# Patient Record
Sex: MALE | Race: WHITE | URBAN - METROPOLITAN AREA
[De-identification: names, ages, dates, MRNs, and addresses within clinical notes are randomized per-mention and may not be internally consistent; named-entity substitution may affect disease eponyms.]

---

## 2021-09-21 ENCOUNTER — HOSPITAL ENCOUNTER (EMERGENCY)
Facility: HOSPITAL | Age: 46
Discharge: HOME | End: 2021-09-21
Attending: EMERGENCY MEDICINE | Admitting: EMERGENCY MEDICINE
Payer: MEDICARE

## 2021-09-21 ENCOUNTER — APPOINTMENT (EMERGENCY)
Dept: RADIOLOGY | Facility: HOSPITAL | Age: 46
End: 2021-09-21
Payer: MEDICARE

## 2021-09-21 VITALS
OXYGEN SATURATION: 97 % | HEIGHT: 71 IN | SYSTOLIC BLOOD PRESSURE: 158 MMHG | TEMPERATURE: 96.7 F | RESPIRATION RATE: 20 BRPM | HEART RATE: 110 BPM | DIASTOLIC BLOOD PRESSURE: 101 MMHG

## 2021-09-21 DIAGNOSIS — J96.01 ACUTE RESPIRATORY FAILURE WITH HYPOXIA (HCC): ICD-10-CM

## 2021-09-21 DIAGNOSIS — R06.02 SHORTNESS OF BREATH: Primary | ICD-10-CM

## 2021-09-21 LAB
ALBUMIN SERPL BCP-MCNC: 3.8 G/DL (ref 3.5–5)
ALP SERPL-CCNC: 75 U/L (ref 46–116)
ALT SERPL W P-5'-P-CCNC: 68 U/L (ref 12–78)
ANION GAP SERPL CALCULATED.3IONS-SCNC: 3 MMOL/L (ref 4–13)
AST SERPL W P-5'-P-CCNC: 31 U/L (ref 5–45)
ATRIAL RATE: 118 BPM
BASE EX.OXY STD BLDV CALC-SCNC: 79.6 % (ref 60–80)
BASE EXCESS BLDV CALC-SCNC: 0.3 MMOL/L
BASOPHILS # BLD AUTO: 0.05 THOUSANDS/ΜL (ref 0–0.1)
BASOPHILS NFR BLD AUTO: 1 % (ref 0–1)
BILIRUB SERPL-MCNC: 0.64 MG/DL (ref 0.2–1)
BUN SERPL-MCNC: 9 MG/DL (ref 5–25)
CALCIUM SERPL-MCNC: 9.8 MG/DL (ref 8.3–10.1)
CHLORIDE SERPL-SCNC: 106 MMOL/L (ref 100–108)
CO2 SERPL-SCNC: 30 MMOL/L (ref 21–32)
CREAT SERPL-MCNC: 0.78 MG/DL (ref 0.6–1.3)
D DIMER PPP FEU-MCNC: 0.27 UG/ML FEU
EOSINOPHIL # BLD AUTO: 0.54 THOUSAND/ΜL (ref 0–0.61)
EOSINOPHIL NFR BLD AUTO: 6 % (ref 0–6)
ERYTHROCYTE [DISTWIDTH] IN BLOOD BY AUTOMATED COUNT: 12.3 % (ref 11.6–15.1)
GFR SERPL CREATININE-BSD FRML MDRD: 108 ML/MIN/1.73SQ M
GLUCOSE SERPL-MCNC: 96 MG/DL (ref 65–140)
HCO3 BLDV-SCNC: 26.7 MMOL/L (ref 24–30)
HCT VFR BLD AUTO: 42.7 % (ref 36.5–49.3)
HGB BLD-MCNC: 14.6 G/DL (ref 12–17)
IMM GRANULOCYTES # BLD AUTO: 0.03 THOUSAND/UL (ref 0–0.2)
IMM GRANULOCYTES NFR BLD AUTO: 0 % (ref 0–2)
LYMPHOCYTES # BLD AUTO: 2.08 THOUSANDS/ΜL (ref 0.6–4.47)
LYMPHOCYTES NFR BLD AUTO: 21 % (ref 14–44)
MCH RBC QN AUTO: 29.5 PG (ref 26.8–34.3)
MCHC RBC AUTO-ENTMCNC: 34.2 G/DL (ref 31.4–37.4)
MCV RBC AUTO: 86 FL (ref 82–98)
MONOCYTES # BLD AUTO: 0.74 THOUSAND/ΜL (ref 0.17–1.22)
MONOCYTES NFR BLD AUTO: 8 % (ref 4–12)
NEUTROPHILS # BLD AUTO: 6.27 THOUSANDS/ΜL (ref 1.85–7.62)
NEUTS SEG NFR BLD AUTO: 64 % (ref 43–75)
NRBC BLD AUTO-RTO: 0 /100 WBCS
NT-PROBNP SERPL-MCNC: 11 PG/ML
O2 CT BLDV-SCNC: 16.7 ML/DL
P AXIS: 25 DEGREES
PCO2 BLDV: 49.6 MM HG (ref 42–50)
PH BLDV: 7.35 [PH] (ref 7.3–7.4)
PLATELET # BLD AUTO: 235 THOUSANDS/UL (ref 149–390)
PMV BLD AUTO: 9.3 FL (ref 8.9–12.7)
PO2 BLDV: 44.5 MM HG (ref 35–45)
POTASSIUM SERPL-SCNC: 3.9 MMOL/L (ref 3.5–5.3)
PR INTERVAL: 160 MS
PROT SERPL-MCNC: 7.7 G/DL (ref 6.4–8.2)
QRS AXIS: 8 DEGREES
QRSD INTERVAL: 86 MS
QT INTERVAL: 310 MS
QTC INTERVAL: 434 MS
RBC # BLD AUTO: 4.95 MILLION/UL (ref 3.88–5.62)
SARS-COV-2 RNA RESP QL NAA+PROBE: NEGATIVE
SODIUM SERPL-SCNC: 139 MMOL/L (ref 136–145)
T WAVE AXIS: 10 DEGREES
TROPONIN I SERPL-MCNC: <0.02 NG/ML
VENTRICULAR RATE: 118 BPM
WBC # BLD AUTO: 9.71 THOUSAND/UL (ref 4.31–10.16)

## 2021-09-21 PROCEDURE — 85379 FIBRIN DEGRADATION QUANT: CPT | Performed by: EMERGENCY MEDICINE

## 2021-09-21 PROCEDURE — U0003 INFECTIOUS AGENT DETECTION BY NUCLEIC ACID (DNA OR RNA); SEVERE ACUTE RESPIRATORY SYNDROME CORONAVIRUS 2 (SARS-COV-2) (CORONAVIRUS DISEASE [COVID-19]), AMPLIFIED PROBE TECHNIQUE, MAKING USE OF HIGH THROUGHPUT TECHNOLOGIES AS DESCRIBED BY CMS-2020-01-R: HCPCS | Performed by: EMERGENCY MEDICINE

## 2021-09-21 PROCEDURE — 93010 ELECTROCARDIOGRAM REPORT: CPT | Performed by: INTERNAL MEDICINE

## 2021-09-21 PROCEDURE — 36415 COLL VENOUS BLD VENIPUNCTURE: CPT | Performed by: EMERGENCY MEDICINE

## 2021-09-21 PROCEDURE — 36000 PLACE NEEDLE IN VEIN: CPT | Performed by: EMERGENCY MEDICINE

## 2021-09-21 PROCEDURE — 85025 COMPLETE CBC W/AUTO DIFF WBC: CPT | Performed by: EMERGENCY MEDICINE

## 2021-09-21 PROCEDURE — U0005 INFEC AGEN DETEC AMPLI PROBE: HCPCS | Performed by: EMERGENCY MEDICINE

## 2021-09-21 PROCEDURE — 76937 US GUIDE VASCULAR ACCESS: CPT | Performed by: EMERGENCY MEDICINE

## 2021-09-21 PROCEDURE — 93005 ELECTROCARDIOGRAM TRACING: CPT

## 2021-09-21 PROCEDURE — 82805 BLOOD GASES W/O2 SATURATION: CPT | Performed by: EMERGENCY MEDICINE

## 2021-09-21 PROCEDURE — 99285 EMERGENCY DEPT VISIT HI MDM: CPT

## 2021-09-21 PROCEDURE — 84484 ASSAY OF TROPONIN QUANT: CPT | Performed by: EMERGENCY MEDICINE

## 2021-09-21 PROCEDURE — 83880 ASSAY OF NATRIURETIC PEPTIDE: CPT | Performed by: EMERGENCY MEDICINE

## 2021-09-21 PROCEDURE — 99291 CRITICAL CARE FIRST HOUR: CPT | Performed by: EMERGENCY MEDICINE

## 2021-09-21 PROCEDURE — 71045 X-RAY EXAM CHEST 1 VIEW: CPT

## 2021-09-21 PROCEDURE — 80053 COMPREHEN METABOLIC PANEL: CPT | Performed by: EMERGENCY MEDICINE

## 2021-09-21 NOTE — ED NOTES
Unable to obtain IV access, Dr Royanne Najjar aware of need for [address], RN  09/21/21 3156

## 2021-09-22 NOTE — ED ATTENDING ATTESTATION
9/21/2021  IAnthony DO, saw and evaluated the patient  I have discussed the patient with the resident/non-physician practitioner and agree with the resident's/non-physician practitioner's findings, Plan of Care, and MDM as documented in the resident's/non-physician practitioner's note, except where noted  All available labs and Radiology studies were reviewed  I was present for key portions of any procedure(s) performed by the resident/non-physician practitioner and I was immediately available to provide assistance  At this point I agree with the current assessment done in the Emergency Department  I have conducted an independent evaluation of this patient a history and physical is as follows:    77-year-old male presents for altered mental status and hypoxia  Found by family member confused and breathing heavily  EMS was called  EMS states that when they arrived he had multiple bags of a white powder substance around him akin to drug paraphernalia with multiple needles around him as well  He has a history of IV drug abuse according to EMS and this is confirmed in the medical records  Patient is somnolent with reactive pupils  He denies any drug use  He states he thinks this is COVID  Apparently EMS states that family stated that someone works in the healthcare system in his family and test him at home regularly when he becomes altered similar to his presentation right now  Patient has no evidence of any trauma  He is afebrile  He was hypoxic to the mid 80s and placed on 4 L nasal cannula with improvement  He has no specific complaints other than that he feels like his chest is tight  When I asked him what the needles, drugs, and drug paraphernalia around him were if they were in fact not illicit drugs, he states initially it was for his dogs asthma    A few minutes later he then stated that it was for his dog's hair loss as they were trying to hair transplants or plugs akin to Pati  in male pattern baldness  He then fell asleep again  I again asked him what the substances around him at home were when EMS found him, and he now tells me that he doesn't have any needles nor does he have a dog  A/p: hypoxia, AMS likely 2/2 drug abuse, rule out covid, labs, trop, ddimer but doubt PE, cxr    cxr reviewed by me and shows atelectasis, no signs of pna, no leukocytosis  paitent now saturating 98 percent on room air while ambulating  No need for abx  His hypoxia is likely 2/2 drug overdose he still denies this  I placed an US guided IV line in his left deep brachial vein due to difficulty for nurse in  vasculature access  Procedure Note - Ultrasound Guided Peripheral IV    Ultrasound dynamic guidance was used for peripheral line insertion  Risks and benefits of the procedure were discussed with the patient  Discussed risks included pain with the procedure, bleeding, and risk of infection  Indication: Difficult non-ultrasound guided peripheral intravenous line insertion  Location: Laterally: left upper extremity  Procedure: The patient was prepped using standard ultrasound guided IV procedures  Using direct visualization of the intravenous catheter/needle, the vessel was successfully cannulated with return of blood and advancement of the catheter  The catheter was secured in the standard technique  Complications: None  Interpretation: Successful ultrasound guided peripheral IV  This is a billable ultrasound guided procedure  Ultrasound images stored on the [employer] ultrasound image , or as an attachment to this chart          ED Course         Critical Care Time  CriticalCare Time  Performed by: Maya Solano DO  Authorized by: Maya Solano DO     Critical care provider statement:     Critical care time (minutes):  30    Critical care time was exclusive of:  Separately billable procedures and treating other patients and teaching time    Critical care was necessary to treat or prevent imminent or life-threatening deterioration of the following conditions:  Respiratory failure    Critical care was time spent personally by me on the following activities:  Blood draw for specimens, obtaining history from patient or surrogate, re-evaluation of patient's condition, review of old charts, evaluation of patient's response to treatment, examination of patient, ordering and review of laboratory studies, ordering and performing treatments and interventions, development of treatment plan with patient or surrogate and ordering and review of radiographic studies

## 2021-09-22 NOTE — ED PROVIDER NOTES
History  Chief Complaint   Patient presents with    Altered Mental Status     Pt brought in for evaluation of AMS  per EMS daughter called ems after pt found altered at home     HPI     54 yo M with unknown past medical history who presents for evaluation of shortness of breath and altered mental status  Patient was brought in by EMS  Per EMS, patient's family called for 911 because patient was altered and confused tonight  EMS states patient had drugs and drug paraphenelia around him tonight  Patient denies any drug use but per EMS has a history of IV drug use  EMS states patient did desat to the mid 80's during transport  He arrives on 6L NC with O2 sat of 100%  Patient states he has been having difficulty breathing for the past one week  He states he thinks he [facility]  Patient is not vaccinated against COVID  Patient denies fevers, chills, chest pain, or cough  Patient states he has had genearlized abdominal pain over the past week  Denies nausea, vomiting or diarrhea  History is limited secondary to patient being poor historian  None       No past medical history on file  No past surgical history on file  No family history on file  I have reviewed and agree with the history as documented  E-Cigarette/Vaping     E-Cigarette/Vaping Substances     Social History     Tobacco Use    Smoking status: Never Smoker   Substance Use Topics    Alcohol use: Not Currently    Drug use: Not Currently     Comment: Pt denies        Review of Systems   Constitutional: Negative for chills and fever  Respiratory: Positive for chest tightness and shortness of breath  Negative for cough  Cardiovascular: Negative for chest pain  Gastrointestinal: Positive for abdominal pain  Negative for diarrhea, nausea and vomiting  All other systems reviewed and are negative        Physical Exam  ED Triage Vitals [09/21/21 1748]   Temperature Pulse Respirations Blood Pressure SpO2   (!) 96 7 °F (35 9 °C) 66 15 (!) 191/131 97 %      Temp Source Heart Rate Source Patient Position - Orthostatic VS BP Location FiO2 (%)   Tympanic Monitor Lying Right arm --      Pain Score       1             Orthostatic Vital Signs  Vitals:    09/21/21 1748 09/21/21 1830   BP: (!) 191/131 (!) 158/101   Pulse: 66 (!) 110   Patient Position - Orthostatic VS: Lying Lying       Physical Exam  Vitals and nursing note reviewed  Constitutional:       General: He is not in acute distress  Appearance: Normal appearance  He is well-developed  He is obese  He is not ill-appearing, toxic-appearing or diaphoretic  Comments: Slightly somnolent, opens eyes to voice and answers some questions  HENT:      Head: Normocephalic and atraumatic  Right Ear: Tympanic membrane normal       Left Ear: Tympanic membrane normal       Nose: Nose normal       Mouth/Throat:      Mouth: Mucous membranes are moist       Pharynx: Oropharynx is clear  Eyes:      Extraocular Movements: Extraocular movements intact  Conjunctiva/sclera: Conjunctivae normal       Comments: Pupils 3 mm, equal and reactive  Cardiovascular:      Rate and Rhythm: Normal rate and regular rhythm  Pulses: Normal pulses  Heart sounds: Normal heart sounds  No murmur heard  No friction rub  No gallop  Pulmonary:      Effort: Pulmonary effort is normal  No respiratory distress  Breath sounds: Normal breath sounds  No wheezing or rales  Abdominal:      General: Bowel sounds are normal  There is no distension  Palpations: Abdomen is soft  Tenderness: There is no abdominal tenderness  There is no guarding or rebound  Musculoskeletal:         General: No swelling or tenderness  Cervical back: Neck supple  Right lower leg: No edema  Left lower leg: No edema  Skin:     General: Skin is warm and dry  Coloration: Skin is not pale  Findings: No erythema or rash  Neurological:      General: No focal deficit present        Mental Status: He is confused  Cranial Nerves: No cranial nerve deficit  Sensory: No sensory deficit  Motor: No weakness     Psychiatric:         Mood and Affect: Mood normal          Behavior: Behavior normal          ED Medications  Medications - No data to display    Diagnostic Studies  Results Reviewed     Procedure Component Value Units Date/Time    D-dimer, quantitative [[id number]]  (Normal) Collected: 09/21/21 1935    Lab Status: Final result Specimen: Blood from Arm, Left Updated: 09/21/21 2010     D-Dimer, Quant 0 27 ug/ml FEU     Novel Coronavirus (Covid-19),PCR [facility] [[id number]]  (Normal) Collected: 09/21/21 1824    Lab Status: Final result Specimen: Nares from Nose Updated: 09/21/21 1941     SARS-CoV-2 Negative    Narrative:           Comprehensive metabolic panel [[id number]]  (Abnormal) Collected: 09/21/21 1824    Lab Status: Final result Specimen: Blood from Arm, Left Updated: 09/21/21 1857     Sodium 139 mmol/L      Potassium 3 9 mmol/L      Chloride 106 mmol/L      CO2 30 mmol/L      ANION GAP 3 mmol/L      BUN 9 mg/dL      Creatinine 0 78 mg/dL      Glucose 96 mg/dL      Calcium 9 8 mg/dL      AST 31 U/L      ALT 68 U/L      Alkaline Phosphatase 75 U/L      Total Protein 7 7 g/dL      Albumin 3 8 g/dL      Total Bilirubin 0 64 mg/dL      eGFR 108 ml/min/1 73sq m     Narrative:      [facility] guidelines for Chronic Kidney Disease (CKD):     Stage 1 with normal or high GFR (GFR > 90 mL/min/1 73 square meters)    Stage 2 Mild CKD (GFR = 60-89 mL/min/1 73 square meters)    Stage 3A Moderate CKD (GFR = 45-59 mL/min/1 73 square meters)    Stage 3B Moderate CKD (GFR = 30-44 mL/min/1 73 square meters)    Stage 4 Severe CKD (GFR = 15-29 mL/min/1 73 square meters)    Stage 5 End Stage CKD (GFR <15 mL/min/1 73 square meters)  Note: GFR calculation is accurate only with a steady state creatinine    NT-BNP PRO [[id number]]  (Normal) Collected: 09/21/21 1824    Lab Status: Final result Specimen: Blood from Arm, Left Updated: 09/21/21 1857     NT-proBNP 11 pg/mL     Troponin I [[id number]]  (Normal) Collected: 09/21/21 1824    Lab Status: Final result Specimen: Blood from Arm, Left Updated: 09/21/21 1856     Troponin I <0 02 ng/mL     Blood gas, venous [[id number]] Collected: 09/21/21 1824    Lab Status: Final result Specimen: Blood from Arm, Left Updated: 09/21/21 1846     pH, David 7 349     pCO2, David 49 6 mm Hg      pO2, David 44 5 mm Hg      HCO3, David 26 7 mmol/L      Base Excess, David 0 3 mmol/L      O2 Content, David 16 7 ml/dL      O2 HGB, VENOUS 79 6 %     CBC and differential [[id number]] Collected: 09/21/21 1824    Lab Status: Final result Specimen: Blood from Arm, Left Updated: 09/21/21 1839     WBC 9 71 Thousand/uL      RBC 4 95 Million/uL      Hemoglobin 14 6 g/dL      Hematocrit 42 7 %      MCV 86 fL      MCH 29 5 pg      MCHC 34 2 g/dL      RDW 12 3 %      MPV 9 3 fL      Platelets 875 Thousands/uL      nRBC 0 /100 WBCs      Neutrophils Relative 64 %      Immat GRANS % 0 %      Lymphocytes Relative 21 %      Monocytes Relative 8 %      Eosinophils Relative 6 %      Basophils Relative 1 %      Neutrophils Absolute 6 27 Thousands/µL      Immature Grans Absolute 0 03 Thousand/uL      Lymphocytes Absolute 2 08 Thousands/µL      Monocytes Absolute 0 74 Thousand/µL      Eosinophils Absolute 0 54 Thousand/µL      Basophils Absolute 0 05 Thousands/µL                  XR chest 1 view portable   Final Result by Akil Rodriguez MD (09/22 6922)      Mild opacification left lung base likely due to atelectasis and/or scar  Early inflammatory infiltrate not excluded  Follow-up imaging recommended as appropriate  The study was marked in [facility] for immediate notification                    Workstation performed: [id number]               Procedures  ECG 12 Lead Documentation Only    Date/Time: 9/22/2021 3:45 PM  Performed by: Rika Goss MD  Authorized by: Rika Goss MD     Indications / Diagnosis:  Shortness of breath  ECG reviewed by me, the ED Provider: yes    Patient location:  ED  Previous ECG:     Previous ECG:  Unavailable    Comparison to cardiac monitor: Yes    Interpretation:     Interpretation: normal    Rate:     ECG rate:  118    ECG rate assessment: tachycardic    Rhythm:     Rhythm: sinus rhythm    Ectopy:     Ectopy: none    QRS:     QRS axis:  Normal    QRS intervals:  Normal  Conduction:     Conduction: normal    ST segments:     ST segments:  Normal  T waves:     T waves: normal            ED Course                                       MDM     56 yo M with unknown past medical history who presents for evaluation of shortness of breath and altered mental status  Differential diagnosis includes: drug use, COVID, ACS, anemia, electrolyte abnormality, PE '  Will check CBC, CMP, troponin, BNP, VBG, EKG, CXR and D-dimer  Will order COVID test   Reviewed labs, BNP normal  Trop negative  EKG shows NSR without acute ischemic changes  CMP and CBC without marked abnormalities  D-dimer negative  CXR shows no acute cardiopulmonary disease  COVID negative  Patient has remained on room air without hypoxia, O2 sat 97-98%  Patient is awake and alert  Dicussed with patent lab results, will discharge patient with PCP follow up  Discussed with patient strict return precautions  Patient expressed understanding and was agreeable for discharge       Disposition  Final diagnoses:   Shortness of breath   Acute respiratory failure with hypoxia ([address] )     Time reflects when diagnosis was documented in both MDM as applicable and the Disposition within this note     Time User Action Codes Description Comment    9/21/2021  8:21 PM Amadeo Sanchez Add [R06 02] Shortness of breath     9/22/2021 12:09 PM Be Berry Add [J96 01] Acute respiratory failure with hypoxia [facility])       ED Disposition     ED Disposition Condition Date/Time Comment    Discharge Stable Tue Sep 21, 2021  8:21 PM Isadore Show discharge to home/self care  Follow-up Information     Follow up With Specialties Details Why Contact Info    Infolink  Schedule an appointment as soon as possible for a visit   [phone]            There are no discharge medications for this patient  No discharge procedures on file  PDMP Review     None           ED Provider  Attending physically available and evaluated Oliva Hein I managed the patient along with the ED Attending      Electronically Signed by         Shahla Javed MD  09/22/21 2860